# Patient Record
Sex: FEMALE
[De-identification: names, ages, dates, MRNs, and addresses within clinical notes are randomized per-mention and may not be internally consistent; named-entity substitution may affect disease eponyms.]

---

## 2023-02-26 ENCOUNTER — HOSPITAL ENCOUNTER (EMERGENCY)
Dept: HOSPITAL 97 - ER | Age: 63
Discharge: HOME | End: 2023-02-26
Payer: COMMERCIAL

## 2023-02-26 VITALS — DIASTOLIC BLOOD PRESSURE: 75 MMHG | TEMPERATURE: 98.7 F | SYSTOLIC BLOOD PRESSURE: 117 MMHG | OXYGEN SATURATION: 100 %

## 2023-02-26 DIAGNOSIS — S53.401A: Primary | ICD-10-CM

## 2023-02-26 DIAGNOSIS — S50.01XA: ICD-10-CM

## 2023-02-26 DIAGNOSIS — I10: ICD-10-CM

## 2023-02-26 DIAGNOSIS — H81.10: ICD-10-CM

## 2023-02-26 DIAGNOSIS — F17.210: ICD-10-CM

## 2023-02-26 PROCEDURE — 99282 EMERGENCY DEPT VISIT SF MDM: CPT

## 2023-02-26 NOTE — ER
Nurse's Notes                                                                                     

 [facility]                                                                 

Name: Princess Elizabeth                                                                           

Age: 62 yrs                                                                                       

Sex: Female                                                                                       

: 1960                                                                                   

MRN: [id number]                                                                                   

Arrival Date: 2023                                                                          

Time: 20:36                                                                                       

Account#: [id number]                                                                            

Bed 12                                                                                            

Private MD:                                                                                       

Diagnosis: Elbow sprain, right elbow contusion, benign positional vertigo                         

                                                                                                  

Presentation:                                                                                     

                                                                                             

20:59 Chief complaint: Patient states: I seems like I blacked out, I had vertigo recently,    ll3 

      reports a fall outside on concrete, c/o right elbow, left knee, and forehead pain 8/10.     

      Coronavirus screen: Vaccine status: Patient reports receiving the 2nd dose of the covid     

      vaccine. At this time, the client does not indicate any symptoms associated with            

      coronavirus-19. Ebola Screen: No symptoms or risks identified at this time. Initial         

      Sepsis Screen: Does the patient meet any 2 criteria? No. Patient's initial sepsis           

      screen is negative. Does the patient have a suspected source of infection? No.              

      Patient's initial sepsis screen is negative. Risk Assessment: Do you want to hurt           

      yourself or someone else? Patient reports no desire to harm self or others. Onset of        

      symptoms was 2023 at 19:00.                                                    

20:59 Method Of Arrival: Ambulatory                                                           ll3 

20:59 Acuity: JANETTE 3                                                                           ll3 

                                                                                                  

Triage Assessment:                                                                                

21:37 General: Appears uncomfortable, Behavior is calm, cooperative. Pain: Complains of pain  ll3 

      in forehead, right elbow and left knee Pain does not radiate. Pain currently is 8 out       

      of 10 on a pain scale. Neuro: Level of Consciousness is awake, alert, obeys commands,       

      Oriented to person, place, time, situation. Musculoskeletal: Circulation, motion, and       

      sensation intact. Reports pain in right elbow and left knee Pain is 8 out of 10 on a        

      pain scale. Injury Description: fall.                                                       

                                                                                                  

Historical:                                                                                       

- Allergies:                                                                                      

21:02 No Known Allergies;                                                                     ll3 

- Home Meds:                                                                                      

21:02 lisinopril 20 mg Oral tab 1 tab once daily [Active];                                    ll3 

- PMHx:                                                                                           

21:02 Hypertensive disorder;                                                                  ll3 

- PSHx:                                                                                           

21:02 Total abdominal hysterectomy; Cholecystectomy;                                          ll3 

                                                                                                  

- Immunization history:: Client reports receiving the 2nd dose of the Covid vaccine.              

- Social history:: Smoking status: Patient reports the use of cigarette tobacco                   

  products, denies chronic smoking, but will smoke occasionally.                                  

                                                                                                  

                                                                                                  

Screenin:42 [facility] ED Fall Risk Assessment (Adult) History of falling in the last 3 months,       ll3 

      including since admission Yes- single mechanical fall (1 pt) Confusion or                   

      Disorientation No (0 pts) Intoxicated or Sedated No (0 pts) Impaired Gait No (0 pts)        

      Mobility Assist Device Used No (0 pt) Altered Elimination No (0 pt) Score/Fall Risk         

      Level 0 - 2 = Low Risk Oriented to surroundings, Maintained a safe environment,             

      Educated pt \T\ family on fall prevention, incl call for assistance when getting out of     

      bed. Abuse screen: Denies threats or abuse. Denies injuries from another. Nutritional       

      screening: No deficits noted. Tuberculosis screening: No symptoms or risk factors           

      identified.                                                                                 

                                                                                                  

Vital Signs:                                                                                      

20:59  / 75; Pulse 97; Resp 16; Temp 98.7(O); Pulse Ox 100% on R/A; Weight 72.57 kg     ll3 

      (R); Height 5 ft. 5 in. (165.10 cm) (R); Pain 8/10;                                         

20:59 Body Mass Index 26.63 (72.57 kg, 165.10 cm)                                             ll3 

                                                                                                  

ED Course:                                                                                        

20:36 Patient arrived in ED.                                                                  ag3 

21:02 Triage completed.                                                                       ll3 

21:02 Arm band placed on.                                                                     ll3 

21:10 Linsey Harmon MD is Attending Physician.                                                sp3 

22:42 Patient has correct armband on for positive identification. Bed in low position. Call   ll3 

      light in reach. Side rails up X 1. Adult w/ patient.                                        

22:42 No provider procedures requiring assistance completed. Patient did not have IV access   ll3 

      during this emergency room visit.                                                           

                                                                                                  

Administered Medications:                                                                         

No medications were administered                                                                  

                                                                                                  

                                                                                                  

Medication:                                                                                       

22:43 VIS not applicable for this client.                                                     ll3 

                                                                                                  

Outcome:                                                                                          

22:36 Discharge ordered by MD.                                                                sp3 

22:42 Discharged to home ambulatory.                                                          ll3 

22:42 Condition: stable                                                                           

22:42 Discharge instructions given to patient, family, Instructed on discharge instructions,      

      follow up and referral plans. medication usage, Demonstrated understanding of               

      instructions, follow-up care, medications, Prescriptions given X 1.                         

22:43 Patient left the ED.                                                                    ll3 

                                                                                                  

Signatures:                                                                                       

Deonna Guzman                                 3                                                  

Linsey Harmon MD MD   sp3                                                  

Loubet, Lynsea, RN                      RN   ll3                                                  

                                                                                                  

**************************************************************************************************

## 2023-02-26 NOTE — EDPHYS
Physician Documentation                                                                           

 [facility]                                                                 

Name: Princess Elizabeth                                                                           

Age: 62 yrs                                                                                       

Sex: Female                                                                                       

: 1960                                                                                   

MRN: [id number]                                                                                   

Arrival Date: 2023                                                                          

Time: 20:36                                                                                       

Account#: [id number]                                                                            

Bed 12                                                                                            

Private MD:                                                                                       

ED Physician Linsey Harmon                                                                         

HPI:                                                                                              

                                                                                             

22:01 This 62 yrs old Female presents to ER via Ambulatory with complaints of Elbow Injury,   sp3 

      Fall Injury.                                                                                

22:01 62-year-old female with history of hypertension presents with right elbow pain after    sp3 

      ground-level fall likely mechanical in nature just prior to arrival. Patient is here        

      with her sister who states that she has been having some vertigo type symptoms over the     

      last week. She was seen at a local urgent care where she was diagnosed with a left          

      otitis media and given antibiotics for which she has finished but periodically still        

      has the symptoms. She does not think that those symptoms occurred today but is unsure.      

      She denies any head injury, loss of consciousness, continuing vertigo, chest pain,          

      syncope, near syncope, lightheadedness, shortness of breath, nausea, vomiting,              

      diarrhea, fever, URI symptoms, focal neurodeficit, or any other symptoms on ROS at this     

      time..                                                                                      

                                                                                                  

Historical:                                                                                       

- Allergies:                                                                                      

21:02 No Known Allergies;                                                                     ll3 

- Home Meds:                                                                                      

21:02 lisinopril 20 mg Oral tab 1 tab once daily [Active];                                    ll3 

- PMHx:                                                                                           

21:02 Hypertensive disorder;                                                                  ll3 

- PSHx:                                                                                           

21:02 Total abdominal hysterectomy; Cholecystectomy;                                          ll3 

                                                                                                  

- Immunization history:: Client reports receiving the 2nd dose of the Covid vaccine.              

- Social history:: Smoking status: Patient reports the use of cigarette tobacco                   

  products, denies chronic smoking, but will smoke occasionally.                                  

                                                                                                  

                                                                                                  

ROS:                                                                                              

22:02 Constitutional: Negative for fever, chills, and weight loss, Eyes: Negative for injury, sp3 

      pain, redness, and discharge, Neck: Negative for injury, pain, and swelling,                

      Cardiovascular: Negative for chest pain, palpitations, and edema, Respiratory: Negative     

      for shortness of breath, cough, wheezing, and pleuritic chest pain, Abdomen/GI:             

      Negative for abdominal pain, nausea, vomiting, diarrhea, and constipation, Back:            

      Negative for injury and pain, Skin: Negative for injury, rash, and discoloration,           

      Psych: Negative for depression, anxiety, suicide ideation, homicidal ideation, and          

      hallucinations, Allergy/Immunology: Negative for hives, rash, and allergies, Endocrine:     

      Negative for neck swelling, polydipsia, polyuria, polyphagia, and marked weight             

      changes, Hematologic/Lymphatic: Negative for swollen nodes, abnormal bleeding, and          

      unusual bruising.                                                                           

22:02 All other systems are negative.                                                             

                                                                                                  

Exam:                                                                                             

22:02 Constitutional:  This is a well developed, well nourished patient who is awake, alert,  sp3 

      and in no acute distress. Head/Face:  Normocephalic, atraumatic. Eyes:  Pupils equal        

      round and reactive to light, extra-ocular motions intact.  Lids and lashes normal.          

      Conjunctiva and sclera are non-icteric and not injected.  Cornea within normal limits.      

      Periorbital areas with no swelling, redness, or edema. ENT:  Nares patent. No nasal         

      discharge, no septal abnormalities noted.  External auditory canals are clear.              

      Oropharynx with no redness, swelling, or masses, exudates, or evidence of obstruction,      

      uvula midline.  Mucous membranes moist. Neck:  Trachea midline, no thyromegaly or           

      masses palpated, and no cervical lymphadenopathy.  Supple, full range of motion without     

      nuchal rigidity, or vertebral point tenderness.  No Meningismus. Chest/axilla:  Normal      

      chest wall appearance and motion.  Nontender with no deformity.  No lesions are             

      appreciated. Cardiovascular:  Regular rate and rhythm with a normal S1 and S2.  No          

      gallops, murmurs, or rubs.  Normal PMI, no JVD.  No pulse deficits. Respiratory:  Lungs     

      have equal breath sounds bilaterally, clear to auscultation and percussion.  No rales,      

      rhonchi or wheezes noted.  No increased work of breathing, no retractions or nasal          

      flaring. Abdomen/GI:  Soft, non-tender, with normal bowel sounds.  No distension or         

      tympany.  No guarding or rebound.  No evidence of tenderness throughout. Skin:  Warm,       

      dry with normal turgor.  Normal color with no rashes, no lesions, and no evidence of        

      cellulitis. Neuro:  Awake and alert, GCS 15, oriented to person, place, time, and           

      situation.  Cranial nerves II-XII grossly intact.  Motor strength 5/5 in all                

      extremities.  Sensory grossly intact.  Cerebellar exam normal.  Normal gait. Psych:         

      Awake, alert, with orientation to person, place and time.  Behavior, mood, and affect       

      are within normal limits.                                                                   

22:02 Musculoskeletal/extremity: Patient has pain on the lateral and medial epicondyles of        

      her elbow without joint effusion.  Range of motion elicits pain along with pronation        

      and supination.  Distal neurovascular exam is normal including radial pulse, hand           

      muscle exam, capillary refill, pain and temperature, sensory as well as                     

      proprioception..                                                                            

                                                                                                  

Vital Signs:                                                                                      

20:59  / 75; Pulse 97; Resp 16; Temp 98.7(O); Pulse Ox 100% on R/A; Weight 72.57 kg     ll3 

      (R); Height 5 ft. 5 in. (165.10 cm) (R); Pain 8/10;                                         

20:59 Body Mass Index 26.63 (72.57 kg, 165.10 cm)                                             ll3 

                                                                                                  

MDM:                                                                                              

22:11 Data reviewed: vital signs, nurses notes. ED course: 62-year-old with possible benign   sp3 

      positional vertigo and right elbow pain/pathology. X-ray of the right elbow will be         

      obtained and dispositioned accordingly. Patient has no signs of infection in her ear,       

      has not had any ringing in her ear, and patient has no nystagmus. I do not believe this     

      is central vertigo in origin as her neurological exam is completely normal. Discharge       

      her on meclizine as well..                                                                  

22:13 Patient medically screened.                                                             sp3 

22:34 ED course: X-rays images reviewed by me demonstrate no fracture or abnormality.         sp3 

      Anterior fat pad is appreciated without sail sign. No posterior fat pad is present.         

      Diagnosed patient with elbow contusion/sprain with OTC meds as needed for pain control.     

      Also discharged on meclizine as needed with follow-up to PCP as needed..                    

                                                                                                  

                                                                                             

21:58 Order name: Elbow Right 3 View XRAY                                                     sp3 

                                                                                                  

Administered Medications:                                                                         

No medications were administered                                                                  

                                                                                                  

                                                                                                  

Disposition Summary:                                                                              

23 22:36                                                                                    

Discharge Ordered                                                                                 

      Location: Home                                                                          sp3 

      Condition: Stable                                                                       sp3 

      Diagnosis                                                                                   

        - Elbow sprain, right elbow contusion, benign positional vertigo                      sp3 

      Followup:                                                                               sp3 

        - With: Private Physician                                                                  

        - When: Upon discharge from the Emergency Department                                       

        - Reason: Continuance of care                                                              

      Discharge Instructions:                                                                     

        - Discharge Summary Sheet                                                             sp3 

        - Benign Positional Vertigo                                                           sp3 

        - Elbow Contusion                                                                     sp3 

      Forms:                                                                                      

        - Medication Reconciliation Form                                                      sp3 

        - Thank You Letter                                                                    sp3 

        - Antibiotic Education                                                                sp3 

        - Prescription Opioid Use                                                             sp3 

      Prescriptions:                                                                              

        - Meclizine 25 mg Oral Tablet                                                              

            - take 1 tablet by ORAL route every 8 hours As needed; 30 tablet; Refills: 0,     sp3 

      Product Selection Permitted                                                                 

Signatures:                                                                                       

Dispatcher [employer]                           Linsey Tovar MD MD   sp3                                                  

Zuhair Gamez RN                      RN   ll3                                                  

                                                                                                  

************************************************************************************************** Quality 110: Preventive Care And Screening: Influenza Immunization: Influenza immunization was not ordered or administered, reason not given Quality 431: Preventive Care And Screening: Unhealthy Alcohol Use - Screening: Patient not identified as an unhealthy alcohol user when screened for unhealthy alcohol use using a systematic screening method Quality 130: Documentation Of Current Medications In The Medical Record: Current Medications Documented Quality 402: Tobacco Use And Help With Quitting Among Adolescents: Patient screened for tobacco and never smoked Detail Level: Detailed Quality 111:Pneumonia Vaccination Status For Older Adults: Pneumococcal Vaccination not Administered or Previously Received, Reason not Otherwise Specified Quality 226: Preventive Care And Screening: Tobacco Use: Screening And Cessation Intervention: Patient screened for tobacco use and is an ex/non-smoker

## 2023-02-26 NOTE — RAD REPORT
EXAM DESCRIPTION:  RAD - Elbow Right 3 View - 2/26/2023 10:27 pm

 

CLINICAL HISTORY:  Smash injury

 

COMPARISON:  None.

 

FINDINGS:  Three views of the right elbow.

 

Mildly displaced fracture of the head of the radius, with intra-articular extension. There is distrac
tion of approximately 3 millimeter across the articular surface.

 

There is no dislocation or periosteal reaction noted. No foreign body. Soft tissue swelling with an e
lbow joint effusion.

 

IMPRESSION:  Displaced intra-articular fracture at the head of the radius. Elbow joint effusion.

## 2023-02-26 NOTE — XMS REPORT
Continuity of Care Document

                          Created on:2023



Patient:HENRY HAIDER

Sex:Female

:1960

External Reference #:[id number]





Demographics







                          Address                   [address] [place] [address]

 

                          Home Phone                [phone]

 

                          Work Phone                [phone]

 

                          Mobile Phone              [phone]

 

                          Email Address             [email or web address]

 

                          Preferred Language        English

 

                          Marital Status            Unknown

 

                          Confucianist Affiliation     Unknown

 

                          Race                      Unknown

 

                          Ethnic Group              Unknown









Author







                          Organization              [facility]

t

 

                          Address                   [address]



                                                    [place] [address]

 

                          Phone                     [phone]









Support







                Name            Relationship    Address         Phone

 

                RUTHY PATEL OT              [address]   [phone]



                                                [place] [address] 

 

                RUTHY PATEL OT              [address]   [phone]



                                                [place] [address] 

 

                NIK PATEL OT              [address]   [phone]



                                                [place] [address] 

 

                RASHID PATEL               Unavailable     [phone]









Care Team Providers







                    Name                Role                Phone

 

                    RAPHAEL TRINIDAD Primary Care Physician Unavailable

 

                    REGLA TRIANA  Attending Clinician Unavailable

 

                    Lenny-Sofia Campoverde Attending Clinician [phone]

 

                    Unknown, Attending  Attending Clinician Unavailable

 

                    SOFIA EDWARD Attending Clinician Unavailable

 

                    Curtis Ferrell     Attending Clinician Unavailable

 

                    JOCELYN JAMES       Attending Clinician Unavailable

 

                    Jocelyn Tapia   Attending Clinician [phone]

 

                    Doctor Unassigned, No Name Attending Clinician Unavailable

 

                    CINDY MA    Attending Clinician Unavailable

 

                    iCndy Thurman Attending Clinician [phone]

 

                    NELIA MURRAY Attending Clinician Unavailable

 

                    Nelia Portillo Attending Clinician [phone]

 

                    Alison Kelsey Attending Clinician [phone]

 

                    ALISON FRAIRE    Attending Clinician Unavailable

 

                    Provider, [facility] Attending Clinician Unavailable

 

                    UNKNOWN, ATTENDING  Attending Clinician Unavailable

 

                    Nany Phillip MD     Attending Clinician [phone]

 

                    NANY PHILLIP        Attending Clinician Unavailable

 

                    Edwar Fernandez MD   Attending Clinician [phone]

 

                    BONIFACIO BURNS   Attending Clinician Unavailable

 

                    ESCOBEDOLACHELLE SANCHES LILIAM  Attending Clinician Unavailable

 

                    YOUNG KOEHLER     Attending Clinician Unavailable

 

                    Fracisco Monahan Attending Clinician [phone]

 

                    FRACISCO MARCANO Attending Clinician Unavailable

 

                    Regla Triana DO Attending Clinician [phone]

 

                    Only, [facility]      Attending Clinician Unavailable

 

                    Lisa Alvarenga RN  Attending Clinician Unavailable

 

                    Only, Web Test      Attending Clinician Unavailable

 

                    Young Koehler MD  Attending Clinician [phone]

 

                    REGLA TRIANA  Admitting Clinician Unavailable

 

                    Raphael Trinidad  Admitting Clinician Unavailable

 

                    Regla Triana DO Admitting Clinician [phone]









Payers







           Payer Name Policy Type Policy Number Effective Date Expiration Date S

[facility] [place]

 

           [employer] [place] -            [id number] 2018 00:00:00          

  



           OUT OF STATE                                             







Problems







       Condition Condition Condition Status Onset  Resolution Last   Treating Co

mments 

Source



       Name   Details Category        Date   Date   Treatment Clinician        



                                                 Date                 

 

       Nausea and Nausea and Disease Active 2020                      Overview

: Univers



       vomiting, vomiting,               -                        Added  ity 

of



       intractabi intractabi               00:00:                      automatic

 [place]



       lity of lity of               00                          ally from Medic

al



       vomiting vomiting                                           request Branc

h



       not    not                                              for    



       specified, specified,                                           surgery 



       unspecifie unspecifie                                           [id number] 



       d vomiting d vomiting                                                  



       type   type                                                    

 

       Gastroesop Gastroesop Disease Active 2020                      Overview

: Univers



       hageal hageal               2-                        Formattin ity of



       reflux reflux               00:00:                      g of this [place]



       disease disease               00                          note   Medical



       without without                                           might be Branch



       esophagiti esophagiti                                           different

 



       s      s                                                from the 



                                                               original. 



                                                               Added  



                                                               automatic 



                                                               ally from 



                                                               request 



                                                               for    



                                                               surgery 



                                                               [id number] 

 

       Colon  Colon  Disease Active 2020                      Overview: Univer

s



       cancer cancer                                       Formattin ity of



       screening screening               00:00:                      g of this [place]

[place]



                                   00                          note   Medical



                                                               might be Branch



                                                               different 



                                                               from the 



                                                               original. 



                                                               Added  



                                                               automatic 



                                                               ally from 



                                                               request 



                                                               for    



                                                               surgery 



                                                               [id number] 

 

       Tear of Tear of Disease Active 2011                             Univers



       lateral lateral                                              ity of



       cartilage cartilage               00:00:                             [place]

[place]



       or     or                   00                                 Medical



       meniscus meniscus                                                  Branch



       of knee, of knee,                                                  



       current current                                                  

 

       Obesity Obesity Disease Active                       Overview: Univ

ers



                                   7-25                        Formattin ity of



                                   00:00:                      g of this [place]



                                                             note   Medical



                                                               might be Branch



                                                               different 



                                                               from the 



                                                               original. 



                                                               ICD10  



                                                               Diagnosis 



                                                               Term   



                                                               Replacer 



                                                               Utility 

 

       HLD    HLD    Disease Active                       Overview: Univer

s



       (hyperlipi (hyperlipi               5-27                        Formattin

 ity of



       demia) demia)               00:00:                      g of this [place]



                                                             note   Medical



                                                               might be Branch



                                                               different 



                                                               from the 



                                                               original. 



                                                               ICD10  



                                                               Diagnosis 



                                                               Term   



                                                               Replacer 



                                                               Utility 

 

       Essential Essential Disease Active                              Uni

vers



       hypertensi hypertensi               5-27                               it

y of



       on, benign on, benign               00:00:                             [place]

[place]



                                   00                                 Medical



                                                                      Branch

 

       RODOLFO    RODOLFO    Disease Active                              Univers



       (obstructi (obstructi               2-25                               it

y of



       ve sleep ve sleep               00:00:                             [place]



       apnea) apnea)                                                Medical



                                                                      Branch







Allergies, Adverse Reactions, Alerts







       Allergy Allergy Status Severity Reaction(s) Onset  Inactive Treating Comm

ents 

Source



       Name   Type                        Date   Date   Clinician        

 

       No Known DA     Active U                                   [facility]



       Allergie                                                     [place]



       s                                  00:00:                      [place]



                                          00                          [facility]

 

       NO KNOWN Drug   Active                                           [facility]



       ALLERGIE Class                                                   ity of



       S                                                              [facility]







Social History







           Social Habit Start Date Stop Date  Quantity   Comments   Source

 

           History of                       Cigarette Smoker            Universi

ty of



           tobacco use                                             [facility]

 

           Exposure to 2022 Not sure              [facility]



           SARS-CoV-2 00:00:00   14:25:00                         [place] [facility]



           (event)                                                Branch

 

           Alcohol intake 2022 Current               University

 of



                      00:00:00   00:00:00   non-drinker of            [place] Medi

rodney



                                            alcohol (finding)            Branch

 

           Tobacco use and 2020 Smokeless tobacco            Un

iversity of



           exposure   00:00:00   00:00:00   non-user              [facility]

 

           Sex Assigned At 1960                       Universit

y of



           Birth      00:00:00   00:00:00                         [facility]









                Smoking Status  Start Date      Stop Date       Source

 

                Smokes tobacco daily 2020 00:00:00                 Univers

ity of [facility]

 

                Never smoker                                    [facility]







Medications







       Ordered Filled Start  Stop   Current Ordering Indication Dosage Frequency

 Signature

                    Comments            Components          Source



     Medication Medication Date Date Medication? Clinician                (SIG) 

          



     Name Name                                                   

 

     cefdinir      2023- Yes       [id number] 300mg      Take 1           Un

ryan



     300 mg                                capsule by           ity of



     capsule      00:00: 05:59                          mouth           [place]



               00   :00                           every 12           Medical



                                                  (twelve)           Branch



                                                  hours for           



                                                  7 days.           

 

     cephALEXin      2022- Yes       [id number] 500mg      Take 1          

 Univers



     500 mg      2-07 12-15                          capsule by           ity of



     capsule      00:00: 05:59                          mouth in           [place]



               00   :00                           the            Medical



                                                  morning           Branch



                                                  and 1           



                                                  capsule in           



                                                  the            



                                                  evening.           



                                                  Do all           



                                                  this for 7           



                                                  days.           

 

     benzonatate      2022- No        [id number] 200mg      Take 1        

   Univers



     200 mg      20                          capsule by           ity of



     capsule      00:00: 05:59                          mouth 3           [place]



               00   :00                           (three)           Medical



                                                  times           Branch



                                                  daily as           



                                                  needed for           



                                                  Cough for           



                                                  up to 10           



                                                  days.           

 

     doxycycline      2022- No             100mg      Take 1           Un

ryan



     hyclate 100                                tablet by           it

y of



     mg tablet      00:00: 04:59                          mouth 2           [place]

s



               00   :00                           (two)           Medical



                                                  times           Branch



                                                  daily for           



                                                  10 days.           

 

     bromphenira      2022- No             5mL       Take 5 mL           

Univers



     mine-pseudo      4-07 04-15                          by mouth 4           i

ty of



     ephedrine-D      00:00: 04:59                          (four)           [place]

as



     M (BROMFED      00   :00                           times           Medical



     DM) 2-30-10                                         daily as           Bran

ch



     mg/5 mL                                         needed for           



     syrup                                         Cold           



                                                  symptoms           



                                                  for up to           



                                                  7 days.           

 

     predniSONE      2022- No             20mg      Take 1           Univ

ers



     20 mg                                tablet by           ity of



     tablet      00:00: 04:59                          mouth           [place]



               00   :00                           daily for           Medical



                                                  5 days.           Branch

 

     ofloxacin      2022- No        [id number] 2[drp]      Place 2        

   Univers



     0.3 %      3-13 03-19                          Drops in           ity of



     ophthalmic      00:00: 04:59                          right eye           T

[place]



     solution      00   :00                           4 (four)           Medical



                                                  times           Branch



                                                  daily for           



                                                  5 days.           

 

     erythromyci      2021- No             .5[in_u      0.5 Inch,        

   Univers



     n          07-05                s]        Left Eye,           ity of



     (ILOTYCIN)      10:00: 09:02                          ONCE NOW,           T

exas



     5 mg/gram      00   :00                           1 dose,           Medical



     (0.5 %)                                         Mon 21           Branch



     ophthalmic                                         at 0500,           



     ointment                                         Routine           



     0.5 Inch                                                        

 

     ofloxacin      2021- No        [id number] 1[drp]      Place 1      

     Univers



     0.3 %                 [id number]           Drop in           ity of



     ophthalmic      00:00: 04:59                          left eye 4           

[place]



     solution      00   :00                           (four)           Medical



                                                  times           Branch



                                                  daily for           



                                                  5 days.           

 

     ofloxacin      2021- No        [id number] 1[drp]      Place 1      

     Univers



     0.3 %                 [id number]           Drop in           ity of



     ophthalmic      00:00: 04:59                          left eye 4           

[place]



     solution      00   :00                           (four)           Medical



                                                  times           Branch



                                                  daily for           



                                                  5 days.           

 

     ofloxacin      2021- No        [id number] 1[drp]      Place 1      

     Univers



     0.3 %                 [id number]           Drop in           ity of



     ophthalmic      00:00: 04:59                          left eye 4           

[place]



     solution      00   :00                           (four)           Medical



                                                  times           Branch



                                                  daily for           



                                                  5 days.           

 

     cefdinir      0      Yes            300mg      Take 300           Univ

ers



     300 mg      4-12                               mg by           ity of



     capsule      00:00:                               mouth 2           [place]



               00                                 (two)           Medical



                                                  times           Branch



                                                  daily.           

 

     predniSONE      0      Yes                      TAKE 2           Unive

rs



     20 mg      4-12                               TABLETS BY           ity of



     tablet      00:00:                               MOUTH           [place]



               00                                 EVERY DAY           Medical



                                                  FOR 5 DAYS           Branch

 

     cefdinir      -0      Yes            300mg      Take 300           Univ

ers



     300 mg      4-12                               mg by           ity of



     capsule      00:00:                               mouth 2           [place]



               00                                 (two)           Medical



                                                  times           Branch



                                                  daily.           

 

     predniSONE      -0      Yes                      TAKE 2           Unive

rs



     20 mg      4-12                               TABLETS BY           ity of



     tablet      00:00:                               MOUTH           [place]



               00                                 EVERY DAY           Medical



                                                  FOR 5 DAYS           Branch

 

     cefdinir      -0      Yes            300mg      Take 300           Univ

ers



     300 mg      4-12                               mg by           ity of



     capsule      00:00:                               mouth 2           [place]



               00                                 (two)           Medical



                                                  times           Branch



                                                  daily.           

 

     predniSONE      -0      Yes                      TAKE 2           Unive

rs



     20 mg      4-12                               TABLETS BY           ity of



     tablet      00:00:                               MOUTH           [place]



               00                                 EVERY DAY           Medical



                                                  FOR 5 DAYS           Branch

 

     cefdinir      -0      Yes            300mg      Take 300           Univ

ers



     300 mg      4-12                               mg by           ity of



     capsule      00:00:                               mouth 2           [place]



               00                                 (two)           Medical



                                                  times           Branch



                                                  daily.           

 

     predniSONE      -0      Yes                      TAKE 2           Unive

rs



     20 mg      4-12                               TABLETS BY           [facility]



     tablet      00:00:                               MOUTH           [place]



               00                                 EVERY DAY           Medical



                                                  FOR 5 DAYS           [facility]

 

     cefdinir      -0      Yes            300mg      Take 300           [facility]

ers



     300 mg      4-12                               mg by           [facility]



     capsule      00:00:                               mouth 2           [place]



               00                                 (two)           Medical



                                                  times           [facility]



                                                  daily.           

 

     predniSONE      -0      Yes                      TAKE 2           [facility]

rs



     20 mg      4-12                               TABLETS BY           [facility]



     tablet      00:00:                               MOUTH           [place]



               00                                 EVERY DAY           Medical



                                                  FOR 5 DAYS           [facility]

 

     cefdinir      -0      Yes            300mg      Take 300           [facility]

ers



     300 mg      4-12                               mg by           [facility]



     capsule      00:00:                               mouth 2           [place]



               00                                 (two)           Medical



                                                  times           [facility]



                                                  daily.           

 

     predniSONE      -0      Yes                      TAKE 2           [facility]

rs



     20 mg      4-12                               TABLETS BY           [facility]



     tablet      00:00:                               MOUTH           [place]



               00                                 EVERY DAY           Medical



                                                  FOR 5 DAYS           [facility]

 

     cefdinir      -0 2- No             300mg      Take 300           [facility]

vers



     300 mg      4-12 04-07                          mg by           [facility]



     capsule      00:00: 00:00                          mouth 2           [place]



               00   :00                           (two)           Medical



                                                  times           [facility]



                                                  daily.           

 

     predniSONE      -0 2- No                       TAKE 2           [facility]

ers



     20 mg      4-12 04-07                          TABLETS BY           [facility]



     tablet      00:00: 00:00                          MOUTH           [place]



               00   :00                           EVERY DAY           Medical



                                                  FOR 5 DAYS           [facility]

 

     dextroamphe      -0      Yes            20mg      Take 20 mg           

[facility]



     tamine-amph      1-29                               by mouth           [facility] 

[facility]



     etamine      17:17:                               daily.           [place]



     (ADDERALL)      20                                                Medical



     20 mg                                                        [facility]



     tablet                                                        

 

     dextroamphe      -0      Yes            20mg      Take 20 mg           

[facility]



     tamine-amph      1-29                               by mouth           [facility] 

of



     etamine      17:17:                               daily.           [place]



     (ADDERALL)      20                                                Medical



     20 mg                                                        [facility]



     tablet                                                        

 

     dextroamphe      -0      Yes            20mg      Take 20 mg           

[facility]



     tamine-amph      1-29                               by mouth           [facility] 

of



     etamine      17:17:                               daily.           [place]



     (ADDERALL)      20                                                Medical



     20 mg                                                        [facility]



     tablet                                                        

 

     dextroamphe      -0      Yes            20mg      Take 20 mg           

[facility]



     tamine-amph      1-29                               by mouth           [facility] 

[facility]



     etamine      17:17:                               daily.           [place]



     (ADDERALL)      20                                                Medical



     20 mg                                                        [facility]



     tablet                                                        

 

     dextroamphe      -0      Yes            20mg      Take 20 mg           

[facility]



     tamine-amph      1-29                               by mouth           [facility] 

[facility]



     etamine      17:17:                               daily.           [place]



     (ADDERALL)      20                                                Medical



     20 mg                                                        [facility]



     tablet                                                        

 

     dextroamphe      1-0      Yes            20mg      Take 20 mg           

[facility]



     tamine-amph      1-29                               by mouth           [facility] 

[facility]



     etamine      17:17:                               daily.           [place]



     (ADDERALL)      20                                                Medical



     20 mg                                                        [facility]



     tablet                                                        

 

     dextroamphe      2021-0      Yes            20mg      Take 20 mg           

[facility]



     tamine-amph      1-29                               by mouth           [facility] 

[facility]



     etamine      17:17:                               daily.           [place]



     (ADDERALL)      20                                                Medical



     20 mg                                                        [facility]



     tablet                                                        

 

     dextroamphe      2021-0      Yes            20mg      Take 20 mg           

[facility]



     tamine-amph      1-29                               by mouth           [facility] 

of



     etamine      11:17:                               daily.           [place]



     (ADDERALL)      20                                                Medical



     20 mg                                                        [facility]



     tablet                                                        

 

     dextroamphe      1-0      Yes            20mg      Take 20 mg           

[facility]



     tamine-amph      1-29                               by mouth           [facility] 

of



     etamine      11:17:                               daily.           [place]



     (ADDERALL)      20                                                Medical



     20 mg                                                        [facility]



     tablet                                                        

 

     dextroamphe      2021-0      Yes            20mg      Take 20 mg           

[facility]



     tamine-amph      1-29                               by mouth           [facility] 

[facility]



     etamine      11:17:                               daily.           [place]



     (ADDERALL)      20                                                Medical



     20 mg                                                        [facility]



     tablet                                                        

 

     dextroamphe      1-0      Yes            20mg      Take 20 mg           

[facility]



     tamine-amph      1-29                               by mouth           [facility] 

of



     etamine      11:17:                               daily.           [place]



     (ADDERALL)      20                                                Medical



     20 mg                                                        [facility]



     tablet                                                        

 

     dextroamphe      1-0      Yes            20mg      Take 20 mg           

[facility]



     tamine-amph      1-29                               by mouth           [facility] 

of



     etamine      11:17:                               daily.           [place]



     (ADDERALL)      20                                                Medical



     20 mg                                                        [facility]



     tablet                                                        

 

     dextroamphe      1-0      Yes            20mg      Take 20 mg           

[facility]



     tamine-amph      1-29                               by mouth           [facility] 

of



     etamine      11:17:                               daily.           [place]



     (ADDERALL)      20                                                Medical



     20 mg                                                        [facility]



     tablet                                                        

 

     dextroamphe      1-0      Yes            20mg      Take 20 mg           

[facility]



     tamine-amph      1-07                               by mouth           [facility] 

of



     etamine      18:10:                               daily.           [place]



     (ADDERALL)      00                                                Medical



     20 mg                                                        [facility]



     tablet                                                        

 

     dextroamphe      2021-0      Yes            20mg      Take 20 mg           

[facility]



     tamine-amph      1-07                               by mouth           [facility] 

of



     etamine      18:10:                               daily.           [place]



     (ADDERALL)      00                                                Medical



     20 mg                                                        [facility]



     tablet                                                        

 

     simethicone      2021-0      Yes                      PRN,           [facility]

s



     (GAS RELIEF                                     Starting           [facility] 

of



     (SIMETHICON      16:29:                               Thu 21           

[place]



     E)) 40      00                                 at 1029,           Medical



     mg/0.6 mL                                         Until           [facility]



     drops                                         Discontinu           



                                                  ed,            



                                                  Routine,           



                                                  Intra-op           

 

     lactated       No             1000mL      at 42           [facility]

rs



     ringers IV       01-07                          mL/hr,           [facility]



     infusion      15:30: 15:29                          1,000 mL,           [place]

as



     1,000 mL      00   :00                           IV             Medical



                                                  Infusion,           [facility]



                                                  ONCE, 1           



                                                  dose, Thu           



                                                  21 at           



                                                  0930,           



                                                  Routine,           



                                                  Endo           



                                                  Pre-op           

 

     dextroamphe            Yes            20mg      Take 20 mg           

[facility]



     tamine-amph      -06                               by mouth           [facility] 

of



     etamine      19:18:                               daily.           [place]



     (ADDERALL)      42                                                Medical



     20 mg                                                        [facility]



     tablet                                                        

 

     PROZAC ORAL      -2020- No                       None           [facility]

rs



                                         Entered           [facility]



               14:59: 00:00                                         [place]



               42   :00                                          Medical



                                                                 [facility]

 

     PROZAC ORAL      -2020- No                       None           [facility]

rs



                                         Entered           [facility]



               14:59: 00:00                                         [place]



               42   :00                                          Medical



                                                                 [facility]

 

     PROZAC ORAL      - 2020- No                       None           [facility]

rs



                                         Entered           [facility] of



               14:59: 00:00                                         [place]



               42   :00                                          Medical



                                                                 [facility]

 

     dextroamphe      2020      Yes            20mg      Take 20 mg           

[facility]



     tamine-amph      2-02                               by mouth           [facility] 

of



     etamine      14:51:                               daily.           [place]



     (ADDERALL)      38                                                Medical



     20 mg                                                        [facility]



     tablet                                                        

 

     dextroamphe      2020      Yes            20mg      Take 20 mg           

[facility]



     tamine-amph      2-02                               by mouth           [facility] 

of



     etamine      14:51:                               daily.           [place]



     (ADDERALL)      38                                                Medical



     20 mg                                                        [facility]



     tablet                                                        

 

     dextroamphe      -      Yes            20mg      Take 20 mg           

[facility]



     tamine-amph      2-02                               by mouth           [facility] 

of



     etamine      14:51:                               daily.           [place]



     (ADDERALL)      38                                                Medical



     20 mg                                                        [facility]



     tablet                                                        

 

     dextroamphe      -      Yes            20mg      Take 20 mg           

[facility]



     tamine-amph      2-02                               by mouth           [facility] 

of



     etamine      14:51:                               daily.           [place]



     (ADDERALL)      38                                                Medical



     20 mg                                                        [facility]



     tablet                                                        

 

     dextroamphe      -      Yes            20mg      Take 20 mg           

[facility]



     tamine-amph      2-02                               by mouth           [facility] 

of



     etamine      14:51:                               daily.           [place]



     (ADDERALL)      38                                                Medical



     20 mg                                                        [facility]



     tablet                                                        

 

     WELLBUTRIN      2020 2020- No                       None           [facility]

s



     ORAL      2-                          Entered           [facility]



               14:50: 00:00                                         [place]



               40   :00                                          Medical



                                                                 [facility]

 

     WELLBUTRIN      2020- No                       None           [facility]

s



     ORAL      2-                          Entered           [facility]



               14:50: 00:00                                         [place]



               40   :00                                          Medical



                                                                 [facility]

 

     WELLBUTRIN      2020- No                       None           [facility]

s



     ORAL      2-                          Entered           [facility]



               14:50: 00:00                                         [place]



               40   :00                                          Medical



                                                                 [facility]

 

     omeprazole      2020      Yes       [id number] 40mg      Take 1           

[facility]



     40 mg      2-02                               capsule by           [facility]



     capsule      00:00:                               mouth           [place]



               00                                 daily.           Medical



                                                                 [facility]

 

     omeprazole      2020      Yes       [id number] 40mg      Take 1           

[facility]



     40 mg      2-02                               capsule by           [facility]



     capsule      00:00:                               mouth           [place]



               00                                 daily.           Medical



                                                                 [facility]

 

     omeprazole      2020      Yes       [id number] 40mg      Take 1           

[facility]



     40 mg      2-02                               capsule by           [facility]



     capsule      00:00:                               mouth           [place]



               00                                 daily.           Medical



                                                                 [facility]

 

     omeprazole      2020      Yes       [id number] 40mg      Take 1           

[facility]



     40 mg      2-02                               capsule by           [facility]



     capsule      00:00:                               mouth           [place]



               00                                 daily.           Medical



                                                                 [facility]

 

     omeprazole      2020      Yes       [id number] 40mg      Take 1           

[facility]



     40 mg      2-02                               capsule by           [facility] of



     capsule      00:00:                               mouth           [place]



               00                                 daily.           Medical



                                                                 [facility]

 

     omeprazole      -      Yes       [id number] 40mg      Take 1           

[facility]



     40 mg      2-02                               capsule by           [facility] of



     capsule      00:00:                               mouth           [place]



               00                                 daily.           Medical



                                                                 [facility]

 

     omeprazole      -      Yes       [id number] 40mg      Take 1           

[facility]



     40 mg      2-02                               capsule by           [facility] of



     capsule      00:00:                               mouth           [place]



               00                                 daily.           Medical



                                                                 [facility]

 

     omeprazole      2020      Yes       [id number] 40mg      Take 1           

[facility]



     40 mg      2-02                               capsule by           [facility] of



     capsule      00:00:                               mouth           [place]



               00                                 daily.           Medical



                                                                 [facility]

 

     omeprazole      2020- No        [id number] 40mg      Take 1          

 [facility]



     40 mg      2-02 -                          capsule by           [facility]



     capsule      00:00: 00:00                          mouth           [place]



               00   :00                           daily.           Medical



                                                                 [facility]

 

     omeprazole      2020- No        [id number] 40mg      Take 1          

 [facility]



     40 mg      2-02 -29                          capsule by           [facility]



     capsule      00:00: 00:00                          mouth           [place]



               00   :00                           daily.           Medical



                                                                 [facility]

 

     sodium,pota      2020 2020- No        [id number] 1U        Take 1         

  [facility]



     ssium,mag      2 12-03                          Units by           ity o

f



     sulfates      00:00: 05:59                          mouth once           [place]



     (SUPREP      00   :00                           now for 1           Medical



     BOWEL PREP                                         dose.           [facility]



     KIT)                                                        



     17.5-3.13-1                                                        



     .6 gram                                                        



     [facility]                                                        

 

     sodium,pota      2020 2020- No        [id number] 1U        Take 1         

  [facility]



     ssium,mag      2- 12-03                          Units by           ity o

f



     sulfates      00:00: 05:59                          mouth once           [place]

[place]



     (SUPREP      00   :00                           now for 1           Medical



     BOWEL PREP                                         dose.           [facility]



     KIT)                                                        



     17.5-3.13-1                                                        



     .6 gram                                                        



     [facility]                                                        

 

     sodium,pota      2020 2020- No        [id number] 1U        Take 1         

  [facility]



     ssium,mag       12-03                          Units by           ity o

f



     sulfates      00:00: 05:59                          mouth once           [facility]

[place]



     (SUPREP      00   :00                           now for 1           Medical



     BOWEL PREP                                         dose.           [facility]



     KIT)                                                        



     17.5-3.13-1                                                        



     .6 gram                                                        



     [facility]                                                        

 

     sodium,pota      2020 2020- No        [id number] 1U        Take 1         

  [facility]



     ssium,mag       12-03                          Units by           ity o

f



     sulfates      00:00: 05:59                          mouth once           [place]

[place]



     (SUPREP      00   :00                           now for 1           Medical



     BOWEL PREP                                         dose.           [facility]



     KIT)                                                        



     17.5-3.13-1                                                        



     .6 gram                                                        



     [facility]                                                        

 

     WELLBUTRIN            Yes                      None           [facility]



     ORAL      1-04                               Entered           ity of



               21:37:                                              [address]

 

     PROZAC ORAL            Yes                      None           Univer

s



               1-04                               Entered           ity of



               21:37:                                              [address]

 

     WELLBUTRIN            Yes                      None           [facility]



     ORAL      1-04                               Entered           ity of



               21:37:                                              [address]

 

     PROZAC ORAL            Yes                      None           Univer

s



               1-04                               Entered           ity of



               21:37:                                              [address]

 

     WELLBUTRIN            Yes                      None           [facility]



     ORAL      1-04                               Entered           ity of



               21:37:                                              [address]

 

     PROZAC ORAL            Yes                      None           Univer

s



               1-04                               Entered           ity of



               21:37:                                              [address]

 

     hydrocodone            Yes            1{tbl}      Take 1 Tab         

  [facility]



     -acetaminop      1-04                               by mouth           ity 

of



     hen (NORCO      00:00:                               every 6           [place]

s



     5) 5-325 mg      00                                 (six)           Medical



     tablet                                         hours as           Branch



                                                  needed for           



                                                  Pain.           

 

     hydrocodone            Yes            1{tbl}      Take 1 Tab         

  [facility]



     -acetaminop      1-04                               by mouth           ity 

of



     hen (NORCO      00:00:                               every 6           [place]

s



     5) 5-325 mg      00                                 (six)           Medical



     tablet                                         hours as           Branch



                                                  needed for           



                                                  Pain.           

 

     hydrocodone            Yes            1{tbl}      Take 1 Tab         

  [facility]



     -acetaminop      1-04                               by mouth           ity 

of



     hen (NORCO      00:00:                               every 6           [place]

s



     5) 5-325 mg      00                                 (six)           Medical



     tablet                                         hours as           Branch



                                                  needed for           



                                                  Pain.           

 

     hydrocodone       2020- No             1{tbl}      Take 1 Tab        

   [facility]



     -acetaminop      1-04 12-02                          by mouth           ity

 of



     hen (NORCO      00:00: 00:00                          every 6           [place]

as



     5) 5-325 mg      00   :00                           (six)           Medical



     tablet                                         hours as           Branch



                                                  needed for           



                                                  Pain.           

 

     hydrocodone       2020- No             1{tbl}      Take 1 Tab        

   [facility]



     -acetaminop      1-04 12-02                          by mouth           ity

 of



     hen (NORCO      00:00: 00:00                          every 6           [place]

as



     5) 5-325 mg      00   :00                           (six)           Medical



     tablet                                         hours as           Branch



                                                  needed for           



                                                  Pain.           

 

     hydrocodone       2020- No             1{tbl}      Take 1 Tab        

   [facility]



     -acetaminop      1-04 12-02                          by mouth           ity

 of



     hen (NORCO      00:00: 00:00                          every 6           [place]

as



     5) 5-325 mg      00   :00                           (six)           Medical



     tablet                                         hours as           Branch



                                                  needed for           



                                                  Pain.           

 

     losartan            Yes            100mg      Take 1 Tab           Un

ryan



     (COZAAR)      6-17                               by mouth           ity of



     100 mg      00:00:                               daily.           [place]



     tablet      00                                                Medical



                                                                 Branch

 

     losartan            Yes            100mg      Take 1 Tab           Un

ryan



     (COZAAR)      6-17                               by mouth           ity of



     100 mg      00:00:                               daily.           [place]



     tablet      00                                                Medical



                                                                 Branch

 

     losartan            Yes            100mg      Take 1 Tab           Un

ryan



     (COZAAR)      6-17                               by mouth           ity of



     100 mg      00:00:                               daily.           [place]



     tablet      00                                                Medical



                                                                 Branch

 

     losartan       2020- No             100mg      Take 1 Tab           U

nivers



     (COZAAR)                                by mouth           ity of



     100 mg      00:00: 00:00                          daily.           [place]



     tablet      00   :00                                          Medical



                                                                 Branch

 

     losartan       2020- No             100mg      Take 1 Tab           U

nivers



     (COZAAR)                                by mouth           ity of



     100 mg      00:00: 00:00                          daily.           [place]



     tablet      00   :00                                          Medical



                                                                 Branch

 

     losartan       2020- No             100mg      Take 1 Tab           U

nivers



     (COZAAR)                                by mouth           ity of



     100 mg      00:00: 00:00                          daily.           [place]



     tablet      00   :00                                          Medical



                                                                 Branch







Immunizations







           Ordered    Filled Immunization Date       Status     Comments   [facility]

e



           Immunization Name Name                                        

 

           Pfizer COVID-19 Pfizer COVID-19 2021 Completed             



           Vaccine    Vaccine    00:00:00                         

 

           SARS-COV-2 COVID-19            2021 Completed             Unive

rsity of



           PFIZER VACCINE            00:00:00                         [facility] [place] [facility]

 

           SARS-COV-2 COVID-19            2021 Completed             Unive

rsity of



           PFIZER VACCINE            00:00:00                         [facility] [place] [facility]

 

           SARS-COV-2 COVID-19            2021 Completed             Unive

rsity of



           PFIZER VACCINE            00:00:00                         [facility] [place] [facility]

 

           SARS-COV-2 COVID-19            2021 Completed             Unive

rsity of



           PFIZER VACCINE            00:00:00                         [facility] [place] [facility]

 

           SARS-COV-2 COVID-19            2021 Completed             Unive

rsity of



           PFIZER VACCINE            00:00:00                         [facility] [place] [facility]

 

           SARS-COV-2 COVID-19            2021 Completed             Unive

rsity of



           PFIZER VACCINE            00:00:00                         [facility] [place] [facility]

 

           SARS-COV-2 COVID-19            2021 Completed             Unive

rsity of



           PFIZER VACCINE            00:00:00                         [facility] [place] [facility]

 

           SARS-COV-2 COVID-19            2021 Completed             Unive

rsity of



           PFIZER VACCINE            00:00:00                         [facility] [place] [facility]

 

           SARS-COV-2 COVID-19            2021 Completed             Unive

rsity of



           PFIZER VACCINE            00:00:00                         [facility] [place] [facility]

 

           SARS-COV-2 COVID-19            2021 Completed             Unive

rsity of



           PFIZER VACCINE            00:00:00                         [facility] [place] [facility]

 

           SARS-COV-2 COVID-19            2021 Completed             Unive

rsity of



           PFIZER VACCINE            00:00:00                         [facility] [place] [facility]

 

           Pfizer COVID-19 Pfizer COVID-19 2021 Completed             



           Vaccine    Vaccine    00:00:00                         

 

           SARS-COV-2 COVID-19            2021 Completed             Unive

rsity of



           PFIZER VACCINE            00:00:00                         [facility] [place] [facility]

 

           SARS-COV-2 COVID-19            2021 Completed             Unive

rsity of



           PFIZER VACCINE            00:00:00                         [facility] [place] [facility]

 

           SARS-COV-2 COVID-19            2021 Completed             Unive

rsity of



           PFIZER VACCINE            00:00:00                         [facility] [place] [facility]

 

           SARS-COV-2 COVID-19            2021 Completed             Unive

rsity of



           PFIZER VACCINE            00:00:00                         [facility]

 

           SARS-COV-2 COVID-19            2021 Completed             Unive

rsity of



           PFIZER VACCINE            00:00:00                         [facility] [place] [facility]

 

           SARS-COV-2 COVID-19            2021 Completed             Unive

rsity of



           PFIZER VACCINE            00:00:00                         [facility] [place] [facility]

 

           SARS-COV-2 COVID-19            2021 Completed             Unive

rsity of



           PFIZER VACCINE            00:00:00                         [facility]

 

           SARS-COV-2 COVID-19            2021 Completed             Unive

rsity of



           PFIZER VACCINE            00:00:00                         [facility]

 

           SARS-COV-2 COVID-19            2021 Completed             Unive

rsity of



           PFIZER VACCINE            00:00:00                         [facility]

 

           SARS-COV-2 COVID-19            2021 Completed             Unive

rsity of



           PFIZER VACCINE            00:00:00                         [facility]

 

           SARS-COV-2 COVID-19            2021 Completed             Unive

rsity of



           PFIZER VACCINE            00:00:00                         [facility]

 

           Pfizer COVID-19 Pfizer COVID-19 2021 Completed             



           Vaccine    Vaccine    00:00:00                         







Vital Signs







             Vital Name   Observation Time Observation Value Comments     Source

 

             Systolic blood 2023 21:55:00 142 mm[Hg]                Univer

sity of



             pressure                                            [facility]

 

             Diastolic blood 2023 21:55:00 97 mm[Hg]                 Unive

rsity of



             pressure                                            [facility]



                                                                 Branch

 

             Heart rate   2023 21:55:00 98 /min                   [facility]

ty of



                                                                 [facility]



                                                                 Branch

 

             Body temperature 2023 21:55:00 36.89 Yasemin                 Univ

ersity of



                                                                 [facility]



                                                                 Branch

 

             Respiratory rate 2023 21:55:00 16 /min                   Univ

ersity of



                                                                 [facility]



                                                                 Branch

 

             Body height  2023 21:55:00 165.1 cm                  [facility]

ty of



                                                                 [facility]



                                                                 [facility]anch

 

             Body weight  2023 21:55:00 76.431 kg                 [facility]

ty of



                                                                 [facility]



                                                                 [facility]anch

 

             BMI          2023 21:55:00 28.04 kg/m2               [facility]

ty of



                                                                 [facility]



                                                                 Branch

 

             Oxygen saturation in 2023 21:55:00 100 /min                  

[facility]



             Arterial blood by                                        [facility]

[facility]



             Pulse oximetry                                        Branch

 

             Systolic blood 2022 20:21:00 126 mm[Hg]                Univer

sity of



             pressure                                            [facility]



                                                                 Branch

 

             Diastolic blood 2022 20:21:00 77 mm[Hg]                 Unive

rsity of



             pressure                                            [facility]



                                                                 Branch

 

             Heart rate   2022 20:21:00 101 /min                  [facility]

ty of



                                                                 [facility]



                                                                 Branch

 

             Body temperature 2022 20:21:00 36.72 Yasemin                 Univ

ersity of



                                                                 [facility]



                                                                 Branch

 

             Respiratory rate 2022 20:21:00 20 /min                   Univ

ersity of



                                                                 [facility]



                                                                 Branch

 

             Body weight  2022 20:21:00 77.111 kg                 [facility]

ty of



                                                                 [facility]



                                                                 Branch

 

             BMI          2022 20:21:00 28.29 kg/m2               [facility]

ty of



                                                                 [facility]



                                                                 Branch

 

             Oxygen saturation in 2022 20:21:00 98 /min                   

[facility]



             Arterial blood by                                        [place] [facility]

[facility]



             Pulse oximetry                                        Branch

 

             Systolic blood 2022-11-10 00:18:00 140 mm[Hg]                Univer

sity of



             pressure                                            [facility]



                                                                 Branch

 

             Diastolic blood 2022-11-10 00:18:00 90 mm[Hg]                 Unive

rsity of



             pressure                                            [facility]



                                                                 Branch

 

             Heart rate   2022-11-10 00:09:00 98 /min                   [facility]

ty of



                                                                 [facility]



                                                                 Branch

 

             Body temperature 2022-11-10 00:09:00 37.61 Yasemin                 Univ

ersity of



                                                                 [facility]



                                                                 Branch

 

             Respiratory rate 2022-11-10 00:09:00 18 /min                   Univ

ersity of



                                                                 [facility]



                                                                 Branch

 

             Body height  2022-11-10 00:09:00 165.1 cm                  [facility]

ty of



                                                                 [facility]



                                                                 Branch

 

             Body weight  2022-11-10 00:09:00 79.379 kg                 [facility]

ty of



                                                                 [facility]



                                                                 Branch

 

             BMI          2022-11-10 00:09:00 29.12 kg/m2               [facility]

ty of



                                                                 [facility]



                                                                 Branch

 

             Oxygen saturation in 2022-11-10 00:09:00 100 /min                  

[facility]



             Arterial blood by                                        [place] [facility]

[facility]



             Pulse oximetry                                        Branch

 

             Systolic blood 2022 18:05:00 149 mm[Hg]                Univer

sity of



             pressure                                            [facility]



                                                                 Branch

 

             Diastolic blood 2022 18:05:00 89 mm[Hg]                 Unive

rsity of



             pressure                                            [facility]



                                                                 Branch

 

             Heart rate   2022 17:58:00 104 /min                  [facility]

ty of



                                                                 [facility]



                                                                 Branch

 

             Body temperature 2022 17:58:00 37.11 Yasemin                 Univ

ersity of



                                                                 [facility]



                                                                 Branch

 

             Respiratory rate 2022 17:58:00 19 /min                   Univ

ersity of



                                                                 [facility]



                                                                 Branch

 

             Body height  2022 17:58:00 167.6 cm                  [facility]

ty of



                                                                 [facility]



                                                                 Branch

 

             Body weight  2022 17:58:00 84.777 kg                 [facility]

ty of



                                                                 [facility]



                                                                 Branch

 

             BMI          2022 17:58:00 30.17 kg/m2               [facility]

ty of



                                                                 [facility]



                                                                 Branch

 

             Oxygen saturation in 2022 17:58:00 100 /min                  

[facility]



             Arterial blood by                                        [place] [facility]

[facility]



             Pulse oximetry                                        Branch

 

             Systolic blood 2022 00:15:00 135 mm[Hg]                Univer

sity of



             pressure                                            [facility]



                                                                 Branch

 

             Diastolic blood 2022 00:15:00 91 mm[Hg]                 Unive

rsity of



             pressure                                            [facility]



                                                                 Branch

 

             Heart rate   2022 00:15:00 98 /min                   [facility]

ty of



                                                                 [facility]



                                                                 Branch

 

             Body temperature 2022 00:15:00 36.89 Yasemin                 Univ

ersity of



                                                                 [facility]



                                                                 Branch

 

             Respiratory rate 2022 00:15:00 16 /min                   Univ

ersity of



                                                                 [facility]



                                                                 Branch

 

             Body height  2022 00:15:00 167.6 cm                  [facility]

ty of



                                                                 [facility]



                                                                 Branch

 

             Body weight  2022 00:15:00 85.186 kg                 [facility]

ty of



                                                                 [facility]



                                                                 Branch

 

             BMI          2022 00:15:00 30.31 kg/m2               [facility]

ty of



                                                                 [facility]



                                                                 Branch

 

             Oxygen saturation in 2022 00:15:00 100 /min                  

[facility]



             Arterial blood by                                        [place] [facility]

[facility]



             Pulse oximetry                                        Branch

 

             Body height  2021 20:19:00 167.6 cm                  [facility]

ty of



                                                                 [facility]



                                                                 Branch

 

             Body weight  2021 20:19:00 81.647 kg                 [facility]

ty of



                                                                 [facility]



                                                                 Branch

 

             BMI          2021 20:19:00 29.05 kg/m2               [facility]

ty of



                                                                 [facility]



                                                                 Branch

 

             Systolic blood 2021 07:58:00 181 mm[Hg]                Univer

sity of



             pressure                                            [facility]



                                                                 Branch

 

             Diastolic blood 2021 07:58:00 97 mm[Hg]                 Unive

rsity of



             [facility]

 

             Heart rate   2021 07:58:00 76 /min                   Universi

ty of



                                                                 [facility]

 

             Body temperature 2021 07:58:00 36.67 Yasemin                 Univ

ersity of



                                                                 [facility]

 

             Respiratory rate 2021 07:58:00 17 /min                   Univ

ersity of



                                                                 [facility]



                                                                 Branch

 

             Body height  2021 07:58:00 167.6 cm                  Universi

ty of



                                                                 [facility]



                                                                 Branch

 

             Body weight  2021 07:58:00 81.647 kg                 Universi

ty of



                                                                 [facility]



                                                                 Branch

 

             BMI          2021 07:58:00 29.05 kg/m2               Universi

ty of



                                                                 [facility]



                                                                 Branch

 

             Oxygen saturation in 2021 07:58:00 100 /min                  

University of



             Arterial blood by                                        [facility]

[facility]ney



             Pulse oximetry                                        Branch

 

             Systolic blood 2021 17:16:00 134 mm[Hg]                Univer

sity of



             pressure                                            [facility]



                                                                 Branch

 

             Diastolic blood 2021 17:16:00 95 mm[Hg]                 Unive

rsity of



             pressure                                            [facility]



                                                                 Branch

 

             Heart rate   2021 17:16:00 95 /min                   Universi

ty of



                                                                 [facility]



                                                                 Branch

 

             Body temperature 2021 17:13:00 36.06 Yasemin                 Univ

ersity of



                                                                 [facility]



                                                                 Branch

 

             Body height  2021 17:13:00 167.6 cm                  Universi

ty of



                                                                 [facility]



                                                                 Branch

 

             Body weight  2021 17:13:00 81.466 kg                 Universi

ty of



                                                                 [facility]



                                                                 Branch

 

             BMI          2021 17:13:00 28.99 kg/m2               Universi

ty of



                                                                 [facility]



                                                                 Branch

 

             Oxygen saturation in 2021 17:13:00 99 /min                   

University of



             Arterial blood by                                        [facility]

[facility]ney



             Pulse oximetry                                        Branch

 

             Heart rate   2021 17:50:00 83 /min                   Universi

ty of



                                                                 [facility]



                                                                 Branch

 

             Oxygen saturation in 2021 17:50:00 99 /min                   

University of



             Arterial blood by                                        [facility]

[facility]ney



             Pulse oximetry                                        Branch

 

             Respiratory rate 2021 17:45:00 15 /min                   [facility]

 

             Systolic blood 2021 17:40:00 140 mm[Hg]                Univer

sity of



             pressure                                            [facility]



                                                                 Branch

 

             Diastolic blood 2021 17:40:00 107 mm[Hg]                Unive

rsity of



             pressure                                            [facility]



                                                                 [place]

 

             Body temperature 2021 17:30:00 36 Yasemin                    [facility]



                                                                 [place]

 

             Body height  2021 15:13:00 166.4 cm                  Universi

ty of



                                                                 [facility]



                                                                 Branch

 

             Body weight  2021 15:13:00 80.74 kg                  Universi

ty of



                                                                 [facility]



                                                                 Branch

 

             BMI          2021 15:13:00 29.17 kg/m2               Universi

ty of



                                                                 [facility]



                                                                 Branch

 

             Body temperature 2020 14:46:00 36.72 Yasemin                 Univ

ersity of



                                                                 [facility]



                                                                 [place]

 

             Body height  2020 14:46:00 167.6 cm                  Universi

ty of



                                                                 [facility]



                                                                 Branch

 

             Body weight  2020 14:46:00 85.322 kg                 Universi

ty of



                                                                 [facility]



                                                                 Branch

 

             BMI          2020 14:46:00 30.36 kg/m2               Universi

ty of



                                                                 [facility]



                                                                 Branch

 

             Oxygen saturation in 2020 14:46:00 99 /min                   

University of



             Arterial blood by                                        [facility]

[facility]ney



             Pulse oximetry                                        Branch







Procedures







                Procedure       Date / Time     Performing Clinician Source



                                Performed                       

 

                ASSIGNMENT OF BENEFITS 2022 20:12:37 Doctor Unassigned, Un

[facility]



                                                [place]         [facility]

 

                POCT URINALYSIS 2022 00:00:00 Jorge  [place] o

f [facility]

 

                POCT MOLECULAR STREP 2022-11-10 00:16:00 Unknown, Attending [facility]

 

                POCT MOLECULAR STREP 2022 18:09:00 Unknown, Attending Univ

[facility]

 

                CONSENT/REFUSAL FOR 2021 07:50:53 Doctor Unassigned, [facility]



                DIAGNOSIS AND TREATMENT                 [place]         Medical 

Branch

 

                NO SHOW OR MISSED 2021 17:03:32 Doctor Unassigned, [facility]



                APPOINTMENT POLICY                 No Name         Medical Bran

h



                ACKNOWLEDGEMENT                                 

 

                ASSIGNMENT OF BENEFITS 2021 14:49:35 Doctor Unassigned, [facility]

[facility]



                                                [place]         Medical [place]

 

                COLONOSCOPY (ENDO) 2021 14:29:17 Fracisco Mracano [facility]

 

                EGD (ENDO)      2021 14:16:03 Husam Rodrigez [facility]

 

                DISCLOSURE AND CONSENT, 2020 06:01:00 Doctor Unassigned, U

[facility]



                MEDICAL AND SURGICAL                 No Name         Medical [facility]



                PROCEDURES                                      







Encounters







        Start   End     Encounter Admission Attending Care    Care    Encounter 

Source



        Date/Time Date/Time Type    Type    Clinicians Facility Department ID   

   

 

        2021         Emergency                 [facility]    [id number] 

Univers



        05:52:12                                                         [facility]

 

        2021-10-30         Outpatient THIAGO TRIANA [facility]    SONYA     [id number]

 Univers



        13:50:29                         REGLA                         [facility]

 

        2023 Urgent          Lenny-Sofia Rothman [facility]    1.2.84

0.114 [id number] 

Univers



        15:45:00 16:00:00 Care            Unknown, Attending VIDAL  [id number]

         ity of



                                                CITY    [id number]         [facility]  [phone]         [address]



                                                [place]                   

 

        2023 Outpatient R       LENNY-POST [facility]    [id number]

[id number] Univers



        15:45:00 15:45:00                 SOFIA CHO



                                                                        [facility]

 

        2023 Emergency TALIB Hamlin    

[id number] [facility]



        01:59:00 03:15:00                 Curtis                    [address] [place] [address]

 

        2022 Outpatient THIAGO JAMES  [facility]    [id number]

[id number] Univers



        14:30:00 14:55:15                 JOCELYN                          ity [facility]

 

        2022 Urgent          Jocelyn James [facility]    1.2.840.114 

[id number] Univers



        14:30:00 14:45:00 Care            Unknown, Attending LEJOSE  [id number]

         ity [place] [address] [place] [address]  [phone]         [address]                   

 

        2022 Orders          Doctor  RAPHAEL    1.2.840.114 [id number]

75 Univers



        00:00:00 00:00:00 Only            Unassigned, TIANNA   350.1.13.10       

  ity [place] [address]...[address] [place] [address]



                                                        [phone]         [address]

 

        2022 Outpatient R       JUAN C [facility]    [id number]

[id number] Univers



        17:45:00 19:37:37                 CINDY                           ity [facility]

 

        2022 Urgent          Cindy Ma [facility]    1.2.840.1

14 [id number] Univers



        17:45:00 19:37:37 Care            Unknown, Attending LEJOSE  350.1.13.10

         ity [place] [address] [place] [address]  [phone]         [address]                   

 

        2022 Outpatient R       CHINYERE [facility]    [id number]

[id number] Univers



        13:00:00 13:35:34                 NELIA BRUNO                         ity

 [facility]

 

        2022 Urgent          Nelia Murray F [facility]    1.

2.840.114 [id number] 

Univers



        13:00:00 13:35:34 Care            Unknown, Attending LEJOSE  350.1.13.10

         ity [place] [address] [place] [address]  [phone]         [address]                   

 

        2022 Urgent          Alison Fraire [facility]    1.2.840.1

14 [id number] Univers



        19:15:00 19:30:00 Care            Unknown, Attending LEAGUE  350.1.13.10

         ity of



                                                CITY    [id number]         [facility]  [phone]         [address]                   

 

        2022 Outpatient THIAGO FRAIRE [facility]    [id number]

[id number] Univers



        19:15:00 19:15:00                 ALISON                         ity [facility]

 

        2021 Outpatient                 GCCOVIDV GCCOVIDV [id number]

[id number] GCCOVID



        00:00:00 00:00:00                                                 V

 

        2021 Urgent          Provider, [facility]    

1.2.840.114 

[id number]                                Univers



        12:41:45 12:56:45 Care            Unknown, Attending [place]  350.1.13.10

         ity of



                                                City    [id number]         [facility]  [phone]         [address]                   

 

        2021 Outpatient R       ROBERT [facility]    [id number]

[id number] Univers



        12:45:00 12:45:00                 ATTENDING                         [facility]

 

        2021 [facility]          [facility]    1.2.840.114 [id number]

44 Univers



        15:07:14 15:22:14 Visit           [facility]  350.1.13.10         it

y of



                                                EYE     [id number]         [facility]  [phone]         [address]

 

        2021 Outpatient THIAGO PHILLIP   [facility]    [id number]

[id number] Univers



        15:00:00 15:00:00                 [facility]                          ity [facility]

 

        2021 Emergency         Rebecca  [facility]    [id number] [id number]

[id number] Univers



        02:59:00 04:08:00                 [facility]  350.1.13.10         it

y of



                                                League  [id number]         [facility]    [phone]         [address]                 



                                                ([facility])                   

 

        2021-05-10 2021-05-10 Outpatient THIAGO BURNS   [facility]    [id number]

[id number] Univers



        15:15:00 15:15:00                 BONIFACIO                         [facility]

 

        2021 Outpatient R       FANNY, [facility]    [id number]

[id number] Univers



        13:15:00 13:15:00                 LACHELLE                          [facility]

 

        2021 Outpatient THIAGO KOEHLER [facility]    [id number]

[id number] Univers



        07:10:00 07:10:00                 YOUNG                             [facility]

 

        2021 Outpatient                 GCCOVIDV GCCOVIDV [id number]

[id number] GCCOVID



        00:00:00 00:00:00                                                 V

 

        2021 Outpatient THIAGO KOEHLER, [facility]    [id number]

[id number] Univers



        11:10:00 11:10:00                 YOUNG                             [facility]

 

        2021 Outpatient                 GCCOVIDV GCCOVIDV [id number]

[id number] GCCOVID



        00:00:00 00:00:00                                                 V

 

        2021 Office          [facility]    1.2.840.114 [id number]

80 Univers



        11:04:01 11:34:01 Visit           Fracisco VINSON 350.1.13.10         

ity of



                                        [facility]    4.2.7.2.686         [facility] .[phone]         Me

[address]                   

 

        [facility]    [id number]

728 Univers



        11:00:00 11:00:00                 FRACISCO                           [facility]

 

        2021 Orders          Doctor  RAPHAEL    1.2.840.114 [id number]

24 Univers



        00:00:00 00:00:00 Only            Unassigned, TIANNA   350.1.13.10       

  ity of



                                        [place] [facility] [id number]         [place]

as



                                                        [phone]         Medi

rodney



                                                        009             Branch

 

        [facility]    1.2.840.114 [id number]

[id number] Univers



        08:49:00 12:05:00 Encounter         Regla EDMOND [facility]  350.1.13.10      

   ity of



                                                [place]  4.2.7.2.686         [place]

[place]



                                                City    [phone]         [address]                 



                                                ([facility])                   

 

        2021 Orders          Doctor LIM    1.2.840.114 [id number]

28 Univers



        00:00:00 00:00:00 Only            Unassigned, TIANNA   350.1.13.10       

  ity of



                                        [place] [facility] 4.2.7.2.686         [place]

as



                                                        [phone]         [address]

 

        2021 Laboratory         Only, [facility] Test [facility]    1.2.840.

114 [id number] 

Univers



        08:55:17 09:10:17 Only            Regla [facility] 350.1.13.

10         ity of



                                                CARE    4.2.7.2.686         [place]

[place]



                                                CENTER .[id number]         Me

fallon M[address]ZA [address]             [facility]                   

 

        2021 Outpatient THIAGO TRIANA [facility]    [id number]

226 Univers



        08:45:00 08:45:00                 REGLA millan o

f



                                                                        [facility]

 

        2020 Office          [facility]    1.2.840.114 [id number]

20 Univers



        08:32:56 09:02:56 Visit           Fracisco VINSON 350.1.13.10         

ity of



                                        [place]  CARE    [id number]         [facility]

[facility]



                                                CENTER .[id number]         Me

fallon MENDOZA [address] [place] [address]                   

 

        2020 [facility]    [id number]

658 Univers



        09:00:00 09:00:00                 FRACISCO                           ity of



                                                                        [facility]

 

        2020 Orders          Doctor LIM    1.2.840.114 [id number]

57 Univers



        00:00:00 00:00:00 Only            Unassigned, TIANNA   350.1.13.10       

  ity of



                                        [place] [facility] 4.2.7.2.686         [place]

as



                                                        [phone]         [address]

 

        2020 Letter          RAPHAEL Alvarenga    1.2.840.114 [id number]

75 Univers



        00:00:00 00:00:00 (Out)           Lisa WYNN   350.1.13.10         it

y of



                                                HOSPITAL 4.2.7.2.686         [place]

as



                                                        [phone]         Medi

rodney



                                                        [address]             [place]

 

        2020 Laboratory         Only, Web Test [facility]    1.2.840.

114 [id number] 

Univers



        08:43:52 08:58:52 Only            Young Koehler [facility]  350.1.13.10   

      ity of



                                                Specialty [id number]         [place]

[place]



                                                Care -  [phone]         [facility] [address]             [place]

 

        2020 Outpatient R       KIEL, [facility]    [id number]

[id number] Univers



        08:45:00 08:45:00                 YOUNG                             [facility]

 

        2020 Outpatient R       ROBERT, [facility]    [id number]

[id number] Univers



        08:15:00 08:15:00                 ATTENDING                         [facility]

 

        2020 Letter          Doctor LIM    1.2.840.114 [id number]

34 Univers



        00:00:00 00:00:00 (Out)           UnassignedTIANNA   350.1.13.10       

  ity of



                                        [place] [facility] 4.2.7.2.686         [place]

as



                                                        [phone]         Medi

rodney



                                                        044             Branch







Results







           Test Description Test Time  Test Comments Results    Result Comments 

Source









                    POCT URINALYSIS W SPECIFIC GRAVITY 2022 20:31:00 









                      Test Item  Value      Reference Range Interpretation Comme

nts









             POCT U SP GRAV (test code = 1.015 mg/dl  1.005-1.025               



             3255)                                               

 

             POCT PH U (test code = 3254) 5 mg/dl      5-8                      

 

 

             POCT U LEUK EST (test code = ++           Negative - Negative      

        



             3263)                                               

 

             POCT U NIT (test code = 3262) Negative     Negative - Negative     

         

 

             POCT U PROT (test code = 3259) Trace        Negative - Negative    

          

 

             POCT U GLU (test code = 3256) Negative     Negative - Negative     

         

 

             POCT U KETONE (test code = Negative     Negative - Negative        

      



             3258)                                               

 

             POCT U UROBILI (test code = Normal       0.2-1                     



             3260)                                               

 

             POCT U BILI (test code = 3261) Negative     Negative - Negative    

          

 

             POCT U BLD (test code = 3257) About 250 Jayson/uL Negative - Negative 

             

 

             POCT U COLOR (test code = 3266) Yellow                             

    

 

             POCT U APPEAR (test code = Cloudy                                 



             3267)                                               

 

             SHADY (test code = SHADY) accurate development and                     

      



                          interpretation of all internal                        

   



                          controls                               

 

             Lab Interpretation (test code = Abnormal                           

    



             74866-6)                                            



[facility] MOLECULAR STREP2022-11-10 00:24:19





             Test Item    Value        Reference Range Interpretation Comments

 

             POCT Molecular Strep (test code = Negative     Negative            

      



             21093-9)                                            

 

             Lab Interpretation (test code = Normal                             

    



             64649-2)                                            



[facility] MOLECULAR [id number] 18:19:53





             Test Item    Value        Reference Range Interpretation Comments

 

             POCT Molecular Strep (test code = Negative     Negative            

      



             61133-0)                                            

 

             Lab Interpretation (test code = Normal                             

    



             [id number]                                            



[facility]